# Patient Record
Sex: FEMALE | Race: WHITE | NOT HISPANIC OR LATINO | URBAN - METROPOLITAN AREA
[De-identification: names, ages, dates, MRNs, and addresses within clinical notes are randomized per-mention and may not be internally consistent; named-entity substitution may affect disease eponyms.]

---

## 2019-08-30 ENCOUNTER — APPOINTMENT (RX ONLY)
Dept: URBAN - METROPOLITAN AREA CLINIC 126 | Facility: CLINIC | Age: 69
Setting detail: DERMATOLOGY
End: 2019-08-30

## 2019-08-30 DIAGNOSIS — L64.8 OTHER ANDROGENIC ALOPECIA: ICD-10-CM

## 2019-08-30 DIAGNOSIS — L82.1 OTHER SEBORRHEIC KERATOSIS: ICD-10-CM

## 2019-08-30 DIAGNOSIS — D485 NEOPLASM OF UNCERTAIN BEHAVIOR OF SKIN: ICD-10-CM

## 2019-08-30 DIAGNOSIS — D22 MELANOCYTIC NEVI: ICD-10-CM

## 2019-08-30 DIAGNOSIS — L71.0 PERIORAL DERMATITIS: ICD-10-CM

## 2019-08-30 PROBLEM — D48.5 NEOPLASM OF UNCERTAIN BEHAVIOR OF SKIN: Status: ACTIVE | Noted: 2019-08-30

## 2019-08-30 PROBLEM — D22.5 MELANOCYTIC NEVI OF TRUNK: Status: ACTIVE | Noted: 2019-08-30

## 2019-08-30 PROCEDURE — ? DEFER

## 2019-08-30 PROCEDURE — 99203 OFFICE O/P NEW LOW 30 MIN: CPT

## 2019-08-30 PROCEDURE — ? COUNSELING

## 2019-08-30 PROCEDURE — ? MEDICAL CONSULTATION: BROWN SPOTS

## 2019-08-30 PROCEDURE — ? PRESCRIPTION

## 2019-08-30 PROCEDURE — ? TREATMENT REGIMEN

## 2019-08-30 PROCEDURE — ? DIAGNOSIS COMMENT

## 2019-08-30 RX ORDER — PIMECROLIMUS 10 MG/G
CREAM TOPICAL BID
Qty: 1 | Refills: 2 | Status: ERX | COMMUNITY
Start: 2019-08-30

## 2019-08-30 RX ORDER — SULFACETAMIDE SODIUM, SULFUR 98; 48 MG/G; MG/G
LIQUID TOPICAL
Qty: 1 | Refills: 2 | Status: ERX | COMMUNITY
Start: 2019-08-30

## 2019-08-30 RX ADMIN — SULFACETAMIDE SODIUM, SULFUR: 98; 48 LIQUID TOPICAL at 14:28

## 2019-08-30 RX ADMIN — PIMECROLIMUS: 10 CREAM TOPICAL at 14:28

## 2019-08-30 ASSESSMENT — LOCATION SIMPLE DESCRIPTION DERM
LOCATION SIMPLE: LEFT CHEEK
LOCATION SIMPLE: LEFT LIP
LOCATION SIMPLE: LEFT KNEE
LOCATION SIMPLE: RIGHT CHEEK
LOCATION SIMPLE: RIGHT UPPER BACK
LOCATION SIMPLE: CHEST
LOCATION SIMPLE: LEFT SCALP
LOCATION SIMPLE: ABDOMEN

## 2019-08-30 ASSESSMENT — LOCATION ZONE DERM
LOCATION ZONE: LEG
LOCATION ZONE: LIP
LOCATION ZONE: FACE
LOCATION ZONE: SCALP
LOCATION ZONE: TRUNK

## 2019-08-30 ASSESSMENT — LOCATION DETAILED DESCRIPTION DERM
LOCATION DETAILED: LEFT LOWER CUTANEOUS LIP
LOCATION DETAILED: LEFT SUPERIOR CENTRAL BUCCAL CHEEK
LOCATION DETAILED: LEFT KNEE
LOCATION DETAILED: EPIGASTRIC SKIN
LOCATION DETAILED: RIGHT INFERIOR MEDIAL UPPER BACK
LOCATION DETAILED: RIGHT MEDIAL UPPER BACK
LOCATION DETAILED: MIDDLE STERNUM
LOCATION DETAILED: LEFT MEDIAL FRONTAL SCALP
LOCATION DETAILED: RIGHT SUPERIOR MEDIAL BUCCAL CHEEK
LOCATION DETAILED: LEFT MEDIAL SUPERIOR CHEST

## 2019-08-30 NOTE — PROCEDURE: DEFER
Procedure To Be Performed At Next Visit: Biopsy by shave method
Detail Level: Simple
Introduction Text (Please End With A Colon): Tran Granados PA-C

## 2019-08-30 NOTE — PROCEDURE: MIPS QUALITY
Detail Level: Simple
Quality 474: Zoster Vaccination Status: Shingrix vaccine was not administered for reasons documented by clinician (e.g. patient administered vaccine other than Shingrix, patient allergy or other medical reasons, patient declined or other patient reasons, vaccine not available or other system reasons)
Quality 131: Pain Assessment And Follow-Up: Pain assessment using a standardized tool is documented as negative, no follow-up plan required
Quality 130: Documentation Of Current Medications In The Medical Record: Current Medications Documented
Additional Notes: Pain 0/10, shingrix denied

## 2019-09-10 ENCOUNTER — APPOINTMENT (RX ONLY)
Dept: URBAN - METROPOLITAN AREA CLINIC 128 | Facility: CLINIC | Age: 69
Setting detail: DERMATOLOGY
End: 2019-09-10

## 2019-09-10 DIAGNOSIS — D485 NEOPLASM OF UNCERTAIN BEHAVIOR OF SKIN: ICD-10-CM

## 2019-09-10 PROBLEM — D48.5 NEOPLASM OF UNCERTAIN BEHAVIOR OF SKIN: Status: ACTIVE | Noted: 2019-09-10

## 2019-09-10 PROCEDURE — ? DIAGNOSIS COMMENT

## 2019-09-10 PROCEDURE — 99212 OFFICE O/P EST SF 10 MIN: CPT

## 2019-09-10 PROCEDURE — ? DEFER

## 2019-09-10 ASSESSMENT — LOCATION ZONE DERM: LOCATION ZONE: LEG

## 2019-09-10 ASSESSMENT — LOCATION SIMPLE DESCRIPTION DERM: LOCATION SIMPLE: LEFT KNEE

## 2019-09-10 ASSESSMENT — LOCATION DETAILED DESCRIPTION DERM: LOCATION DETAILED: LEFT KNEE

## 2019-09-10 NOTE — PROCEDURE: DEFER
Introduction Text (Please End With A Colon): Joanne Garcia PA-C
Detail Level: Simple
Procedure To Be Performed At Next Visit: Biopsy by shave method

## 2020-06-22 ENCOUNTER — APPOINTMENT (RX ONLY)
Dept: URBAN - METROPOLITAN AREA CLINIC 128 | Facility: CLINIC | Age: 70
Setting detail: DERMATOLOGY
End: 2020-06-22

## 2020-06-22 DIAGNOSIS — L82.1 OTHER SEBORRHEIC KERATOSIS: ICD-10-CM

## 2020-06-22 DIAGNOSIS — L82.0 INFLAMED SEBORRHEIC KERATOSIS: ICD-10-CM

## 2020-06-22 DIAGNOSIS — D485 NEOPLASM OF UNCERTAIN BEHAVIOR OF SKIN: ICD-10-CM

## 2020-06-22 DIAGNOSIS — L81.4 OTHER MELANIN HYPERPIGMENTATION: ICD-10-CM

## 2020-06-22 PROBLEM — D48.5 NEOPLASM OF UNCERTAIN BEHAVIOR OF SKIN: Status: ACTIVE | Noted: 2020-06-22

## 2020-06-22 PROCEDURE — 17110 DESTRUCTION B9 LES UP TO 14: CPT

## 2020-06-22 PROCEDURE — ? BENIGN DESTRUCTION

## 2020-06-22 PROCEDURE — ? COSMETIC CONSULTATION: BROWN SPOTS

## 2020-06-22 PROCEDURE — 11102 TANGNTL BX SKIN SINGLE LES: CPT | Mod: 59

## 2020-06-22 PROCEDURE — ? COUNSELING

## 2020-06-22 PROCEDURE — ? BIOPSY BY SHAVE METHOD

## 2020-06-22 PROCEDURE — 99213 OFFICE O/P EST LOW 20 MIN: CPT | Mod: 25

## 2020-06-22 ASSESSMENT — LOCATION SIMPLE DESCRIPTION DERM
LOCATION SIMPLE: CHEST
LOCATION SIMPLE: RIGHT FOREARM
LOCATION SIMPLE: LEFT FOREARM
LOCATION SIMPLE: LEFT KNEE
LOCATION SIMPLE: LEFT PRETIBIAL REGION
LOCATION SIMPLE: RIGHT PRETIBIAL REGION

## 2020-06-22 ASSESSMENT — LOCATION DETAILED DESCRIPTION DERM
LOCATION DETAILED: LEFT DISTAL DORSAL FOREARM
LOCATION DETAILED: RIGHT PROXIMAL PRETIBIAL REGION
LOCATION DETAILED: RIGHT DISTAL DORSAL FOREARM
LOCATION DETAILED: UPPER STERNUM
LOCATION DETAILED: LEFT PROXIMAL DORSAL FOREARM
LOCATION DETAILED: LEFT DISTAL PRETIBIAL REGION
LOCATION DETAILED: LEFT PROXIMAL PRETIBIAL REGION
LOCATION DETAILED: LEFT KNEE
LOCATION DETAILED: RIGHT DISTAL PRETIBIAL REGION

## 2020-06-22 ASSESSMENT — LOCATION ZONE DERM
LOCATION ZONE: TRUNK
LOCATION ZONE: LEG
LOCATION ZONE: ARM

## 2020-06-22 NOTE — PROCEDURE: BIOPSY BY SHAVE METHOD
Body Location Override (Optional - Billing Will Still Be Based On Selected Body Map Location If Applicable): left knee
Detail Level: Detailed
Depth Of Biopsy: dermis
Was A Bandage Applied: Yes
Size Of Lesion In Cm: 0.6
X Size Of Lesion In Cm: 0
Biopsy Type: H and E
Biopsy Method: Personna blade
Anesthesia Type: 1% lidocaine without epinephrine and a 1:10 solution of 8.4% sodium bicarbonate
Anesthesia Volume In Cc (Will Not Render If 0): 0.5
Hemostasis: Aluminum Chloride and Electrocautery
Wound Care: Vaseline
Dressing: pressure dressing with telfa
Destruction After The Procedure: No
Type Of Destruction Used: Curettage
Curettage Text: The wound bed was treated with curettage after the biopsy was performed.
Cryotherapy Text: The wound bed was treated with cryotherapy after the biopsy was performed.
Electrodesiccation Text: The wound bed was treated with electrodesiccation after the biopsy was performed.
Electrodesiccation And Curettage Text: The wound bed was treated with electrodesiccation and curettage after the biopsy was performed.
Silver Nitrate Text: The wound bed was treated with silver nitrate after the biopsy was performed.
Lab: [facility]
Lab Facility: [id number]
Path Notes (To The Dermatopathologist): 0.6cm
Consent: Written consent was obtained and risks were reviewed including but not limited to scarring, infection, bleeding, scabbing, incomplete removal, nerve damage and allergy to anesthesia.
Post-Care Instructions: I reviewed with the patient in detail post-care instructions. Patient is to keep the biopsy site dry overnight, and then apply polysporin or vasoline twice daily until healed. Patient is to keep the lesion covered with a bandaid.
Notification Instructions: Patient will be notified of biopsy results. However, patient instructed to call the office if not contacted within 2 weeks.
Billing Type: [employer]
Information: Selecting Yes will display possible errors in your note based on the variables you have selected. This validation is only offered as a suggestion for you. PLEASE NOTE THAT THE VALIDATION TEXT WILL BE REMOVED WHEN YOU FINALIZE YOUR NOTE. IF YOU WANT TO FAX A PRELIMINARY NOTE YOU WILL NEED TO TOGGLE THIS TO 'NO' IF YOU DO NOT WANT IT IN YOUR FAXED NOTE.

## 2020-06-22 NOTE — PROCEDURE: BENIGN DESTRUCTION
Medical Necessity Clause: This procedure was medically necessary because the lesions that were treated were:
Medical Necessity Information: It is in your best interest to select a reason for this procedure from the list below. All of these items fulfill various CMS LCD requirements except the new and changing color options.
Include Z78.9 (Other Specified Conditions Influencing Health Status) As An Associated Diagnosis?: Yes
Anesthesia Volume In Cc: 0.5
Post-Care Instructions: I reviewed with the patient in detail post-care instructions. Patient is to wear sunprotection, and avoid picking at any of the treated lesions. Pt may apply Vaseline to crusted or scabbing areas.
Add 52 Modifier (Optional): no
Treatment Number (Will Not Render If 0): 0
Detail Level: Simple
Consent: The patient's consent was obtained including but not limited to risks of crusting, scabbing, blistering, scarring, darker or lighter pigmentary change, recurrence, incomplete removal and infection.

## 2020-07-09 ENCOUNTER — APPOINTMENT (RX ONLY)
Dept: URBAN - METROPOLITAN AREA CLINIC 126 | Facility: CLINIC | Age: 70
Setting detail: DERMATOLOGY
End: 2020-07-09

## 2020-07-09 DIAGNOSIS — Z41.9 ENCOUNTER FOR PROCEDURE FOR PURPOSES OTHER THAN REMEDYING HEALTH STATE, UNSPECIFIED: ICD-10-CM

## 2020-07-09 PROCEDURE — ? LASER BROWN SPOTS

## 2020-07-09 ASSESSMENT — LOCATION ZONE DERM
LOCATION ZONE: ARM
LOCATION ZONE: FACE

## 2020-07-09 ASSESSMENT — LOCATION DETAILED DESCRIPTION DERM
LOCATION DETAILED: LEFT SUPERIOR LATERAL BUCCAL CHEEK
LOCATION DETAILED: RIGHT SUPERIOR LATERAL BUCCAL CHEEK
LOCATION DETAILED: RIGHT DISTAL RADIAL DORSAL FOREARM
LOCATION DETAILED: LEFT INFERIOR CENTRAL MALAR CHEEK
LOCATION DETAILED: RIGHT INFERIOR CENTRAL MALAR CHEEK
LOCATION DETAILED: RIGHT DISTAL DORSAL FOREARM

## 2020-07-09 ASSESSMENT — LOCATION SIMPLE DESCRIPTION DERM
LOCATION SIMPLE: RIGHT CHEEK
LOCATION SIMPLE: LEFT CHEEK
LOCATION SIMPLE: RIGHT FOREARM

## 2020-07-09 NOTE — PROCEDURE: LASER BROWN SPOTS
Laser Type: Q-switched Nd:Yag 532nm
Price (Use Numbers Only, No Special Characters Or $): 0
Consent: Written consent obtained, risks reviewed including but not limited to crusting, scabbing, blistering, scarring, darker or lighter pigmentary change, and/or incomplete removal.
Post Procedure Text: Vaseline and/or sunblock applied. Post care reviewed with patient. Strict sun avoidance discussed.
Detail Level: Simple
Spot Size In Mm: 4
Post-Care Instructions: I reviewed with the patient in detail post-care instructions. Patient is to apply vaseline with a q-tip to all crusted areas, and avoid picking at any scabs. Pt should stay away from the sun and wear sun protection until fully healed.
Fluence: 1.2

## 2021-02-18 ENCOUNTER — OFFICE VISIT (OUTPATIENT)
Dept: URBAN - METROPOLITAN AREA CLINIC 9 | Facility: CLINIC | Age: 71
End: 2021-02-18

## 2022-06-04 ENCOUNTER — TELEPHONE ENCOUNTER (OUTPATIENT)
Dept: URBAN - METROPOLITAN AREA CLINIC 68 | Facility: CLINIC | Age: 72
End: 2022-06-04

## 2022-06-04 RX ORDER — PANTOPRAZOLE SODIUM 40 MG/1
TABLET, DELAYED RELEASE ORAL TWICE DAILY
Qty: 30 | Refills: 0 | OUTPATIENT
Start: 2013-02-05 | End: 2013-05-06

## 2022-06-04 RX ORDER — CEFDINIR 300 MG/1
CEFDINIR( 300MG ORAL  TWICE DAILY ) INACTIVE -HX ENTRY CAPSULE ORAL TWICE DAILY
OUTPATIENT
Start: 2012-11-02

## 2022-06-04 RX ORDER — AZITHROMYCIN 500 MG/1
AZITHROMYCIN( 500MG ORAL  DAILY ) INACTIVE -HX ENTRY TABLET, FILM COATED ORAL DAILY
OUTPATIENT
Start: 2012-11-02

## 2022-06-04 RX ORDER — PANTOPRAZOLE SODIUM 40 MG/1
PANTOPRAZOLE SODIUM( 40MG ORAL  DAILY ) INACTIVE -HX ENTRY TABLET, DELAYED RELEASE ORAL DAILY
OUTPATIENT
Start: 2013-01-09

## 2022-06-05 ENCOUNTER — TELEPHONE ENCOUNTER (OUTPATIENT)
Dept: URBAN - METROPOLITAN AREA CLINIC 68 | Facility: CLINIC | Age: 72
End: 2022-06-05

## 2022-06-05 RX ORDER — METHOTREXATE SODIUM 25 MG/ML
METHOTREXATE SODIUM( 25MG/ML INJECTION  WEEKLY ) ACTIVE -HX ENTRY INJECTION, SOLUTION INTRA-ARTERIAL; INTRAMUSCULAR; INTRAVENOUS WEEKLY
Status: ACTIVE | COMMUNITY
Start: 2013-01-30

## 2022-06-05 RX ORDER — B-COMPLEX WITH VITAMIN C
VITAMIN B COMPLEX(  ORAL  DAILY ) ACTIVE -HX ENTRY TABLET ORAL DAILY
Status: ACTIVE | COMMUNITY
Start: 2013-01-30

## 2022-06-05 RX ORDER — MONTELUKAST SODIUM 10 MG/1
MONTELUKAST SODIUM( 10MG ORAL  DAILY ) ACTIVE -HX ENTRY TABLET, FILM COATED ORAL DAILY
Status: ACTIVE | COMMUNITY
Start: 2013-01-30

## 2022-06-05 RX ORDER — DOCUSATE SODIUM 100 MG/1
STOOL SOFTENER( 100MG ORAL  3 PER DAY ) ACTIVE -HX ENTRY CAPSULE, LIQUID FILLED ORAL
Status: ACTIVE | COMMUNITY
Start: 2013-01-30

## 2022-06-05 RX ORDER — HYDROCORTISONE ACETATE 25 MG/1
SUPPOSITORY RECTAL
Qty: 14 | Refills: 2 | Status: ACTIVE | COMMUNITY
Start: 2012-11-02

## 2022-06-05 RX ORDER — MULTIVITAMIN
MULTIPLE VITAMIN(  ORAL  DAILY ) ACTIVE -HX ENTRY TABLET ORAL DAILY
Status: ACTIVE | COMMUNITY
Start: 2013-01-30

## 2022-06-05 RX ORDER — FOLIC ACID 1 MG/1
FOLIC ACID( 1MG ORAL  3 TABS DAILY ) ACTIVE -HX ENTRY TABLET ORAL
Status: ACTIVE | COMMUNITY
Start: 2013-01-30

## 2022-06-25 ENCOUNTER — TELEPHONE ENCOUNTER (OUTPATIENT)
Age: 72
End: 2022-06-25

## 2022-06-25 RX ORDER — PREDNISONE 10 MG/1
PREDNISONE (PAK)( 10MG ORAL  4 MG TAPERING DOWN ) INACTIVE -HX ENTRY TABLET ORAL
OUTPATIENT
Start: 2013-01-30

## 2022-06-25 RX ORDER — PANTOPRAZOLE 40 MG/1
PANTOPRAZOLE SODIUM( 40MG ORAL  DAILY ) INACTIVE -HX ENTRY TABLET, DELAYED RELEASE ORAL DAILY
OUTPATIENT
Start: 2013-01-09

## 2022-06-25 RX ORDER — PANTOPRAZOLE 40 MG/1
TABLET, DELAYED RELEASE ORAL TWICE DAILY
Qty: 30 | Refills: 0 | OUTPATIENT
Start: 2013-02-05 | End: 2013-05-06

## 2022-06-25 RX ORDER — CEFDINIR 300 MG/1
CEFDINIR( 300MG ORAL  TWICE DAILY ) INACTIVE -HX ENTRY CAPSULE ORAL TWICE DAILY
OUTPATIENT
Start: 2012-11-02

## 2022-06-25 RX ORDER — AZITHROMYCIN DIHYDRATE 500 MG/1
AZITHROMYCIN( 500MG ORAL  DAILY ) INACTIVE -HX ENTRY TABLET, FILM COATED ORAL DAILY
OUTPATIENT
Start: 2012-11-02

## 2022-06-26 ENCOUNTER — TELEPHONE ENCOUNTER (OUTPATIENT)
Age: 72
End: 2022-06-26

## 2022-06-26 RX ORDER — FOLIC ACID 1 MG/1
FOLIC ACID( 1MG ORAL  3 TABS DAILY ) ACTIVE -HX ENTRY TABLET ORAL
Status: ACTIVE | COMMUNITY
Start: 2013-01-30

## 2022-06-26 RX ORDER — METHOTREXATE SODIUM 25 MG/ML
METHOTREXATE SODIUM( 25MG/ML INJECTION  WEEKLY ) ACTIVE -HX ENTRY INJECTION, SOLUTION INTRA-ARTERIAL; INTRAMUSCULAR; INTRAVENOUS WEEKLY
Status: ACTIVE | COMMUNITY
Start: 2013-01-30

## 2022-06-26 RX ORDER — ASCORBIC ACID 500 MG
VITAMIN C CR( 500MG ORAL  DAILY ) ACTIVE -HX ENTRY CAPSULE, EXTENDED RELEASE ORAL DAILY
Status: ACTIVE | COMMUNITY
Start: 2013-01-30

## 2022-06-26 RX ORDER — HYDROCORTISONE ACETATE 25 MG/1
SUPPOSITORY RECTAL
Qty: 14 | Refills: 2 | Status: ACTIVE | COMMUNITY
Start: 2012-11-02

## 2022-06-26 RX ORDER — HYDROCORTISONE ACETATE AND PRAMOXINE HYDROCHLORIDE 25; 10 MG/G; MG/G
CREAM TOPICAL
Qty: 14 | Refills: 14 | Status: ACTIVE | COMMUNITY
Start: 2012-11-02

## 2022-06-26 RX ORDER — DOCUSATE SODIUM 100 MG/1
STOOL SOFTENER( 100MG ORAL  3 PER DAY ) ACTIVE -HX ENTRY CAPSULE, LIQUID FILLED ORAL
Status: ACTIVE | COMMUNITY
Start: 2013-01-30

## 2022-06-26 RX ORDER — B-COMPLEX WITH VITAMIN C
VITAMIN B COMPLEX(  ORAL  DAILY ) ACTIVE -HX ENTRY TABLET ORAL DAILY
Status: ACTIVE | COMMUNITY
Start: 2013-01-30

## 2022-06-26 RX ORDER — MONTELUKAST 10 MG/1
MONTELUKAST SODIUM( 10MG ORAL  DAILY ) ACTIVE -HX ENTRY TABLET, FILM COATED ORAL DAILY
Status: ACTIVE | COMMUNITY
Start: 2013-01-30

## 2022-06-26 RX ORDER — CHOLECALCIFEROL (VITAMIN D3) 25 MCG
VITAMIN D( 1000UNIT ORAL  DAILY ) ACTIVE -HX ENTRY TABLET ORAL DAILY
Status: ACTIVE | COMMUNITY
Start: 2013-01-30

## 2022-07-14 ENCOUNTER — FOLLOW UP (OUTPATIENT)
Dept: URBAN - METROPOLITAN AREA CLINIC 33 | Facility: CLINIC | Age: 72
End: 2022-07-14

## 2022-07-14 VITALS — WEIGHT: 146 LBS | BODY MASS INDEX: 27.56 KG/M2 | HEIGHT: 61 IN

## 2022-07-14 DIAGNOSIS — H35.3131: ICD-10-CM

## 2022-07-14 DIAGNOSIS — Z79.899: ICD-10-CM

## 2022-07-14 DIAGNOSIS — H43.391: ICD-10-CM

## 2022-07-14 DIAGNOSIS — H35.371: ICD-10-CM

## 2022-07-14 DIAGNOSIS — G43.B0: ICD-10-CM

## 2022-07-14 DIAGNOSIS — H04.123: ICD-10-CM

## 2022-07-14 DIAGNOSIS — H47.291: ICD-10-CM

## 2022-07-14 PROCEDURE — 92014 COMPRE OPH EXAM EST PT 1/>: CPT

## 2022-07-14 PROCEDURE — 92250 FUNDUS PHOTOGRAPHY W/I&R: CPT

## 2022-07-14 PROCEDURE — 92083 EXTENDED VISUAL FIELD XM: CPT

## 2022-07-14 PROCEDURE — 92134 CPTRZ OPH DX IMG PST SGM RTA: CPT

## 2022-07-14 ASSESSMENT — TONOMETRY
OS_IOP_MMHG: 9
OD_IOP_MMHG: 15

## 2022-07-14 ASSESSMENT — VISUAL ACUITY: OS_SC: 20/20-1

## 2022-07-28 ENCOUNTER — EMERGENCY VISIT (OUTPATIENT)
Dept: URBAN - METROPOLITAN AREA CLINIC 33 | Facility: CLINIC | Age: 72
End: 2022-07-28

## 2022-07-28 VITALS — WEIGHT: 145 LBS | BODY MASS INDEX: 27.38 KG/M2 | HEIGHT: 61 IN

## 2022-07-28 DIAGNOSIS — Z79.899: ICD-10-CM

## 2022-07-28 DIAGNOSIS — G43.B0: ICD-10-CM

## 2022-07-28 DIAGNOSIS — H25.13: ICD-10-CM

## 2022-07-28 DIAGNOSIS — H02.834: ICD-10-CM

## 2022-07-28 DIAGNOSIS — H35.3131: ICD-10-CM

## 2022-07-28 DIAGNOSIS — H47.291: ICD-10-CM

## 2022-07-28 DIAGNOSIS — H02.831: ICD-10-CM

## 2022-07-28 DIAGNOSIS — H43.391: ICD-10-CM

## 2022-07-28 DIAGNOSIS — H04.123: ICD-10-CM

## 2022-07-28 DIAGNOSIS — H57.13: ICD-10-CM

## 2022-07-28 DIAGNOSIS — H35.371: ICD-10-CM

## 2022-07-28 PROCEDURE — 92014 COMPRE OPH EXAM EST PT 1/>: CPT

## 2022-07-28 ASSESSMENT — VISUAL ACUITY: OS_SC: 20/20

## 2022-07-28 ASSESSMENT — TONOMETRY
OS_IOP_MMHG: 10
OD_IOP_MMHG: 11

## 2022-07-30 ENCOUNTER — TELEPHONE ENCOUNTER (OUTPATIENT)
Age: 72
End: 2022-07-30

## 2022-07-31 ENCOUNTER — TELEPHONE ENCOUNTER (OUTPATIENT)
Age: 72
End: 2022-07-31

## 2022-08-03 ENCOUNTER — EMERGENCY VISIT (OUTPATIENT)
Dept: URBAN - METROPOLITAN AREA CLINIC 32 | Facility: CLINIC | Age: 72
End: 2022-08-03

## 2022-08-03 DIAGNOSIS — H11.32: ICD-10-CM

## 2022-08-03 DIAGNOSIS — H35.3131: ICD-10-CM

## 2022-08-03 DIAGNOSIS — H47.291: ICD-10-CM

## 2022-08-03 DIAGNOSIS — H57.13: ICD-10-CM

## 2022-08-03 PROCEDURE — 99213 OFFICE O/P EST LOW 20 MIN: CPT

## 2022-08-03 PROCEDURE — 92250 FUNDUS PHOTOGRAPHY W/I&R: CPT

## 2022-08-03 ASSESSMENT — TONOMETRY
OD_IOP_MMHG: 12
OS_IOP_MMHG: 12

## 2022-08-03 ASSESSMENT — VISUAL ACUITY: OS_SC: 20/25-2

## 2023-08-07 ENCOUNTER — COMPREHENSIVE EXAM (OUTPATIENT)
Dept: URBAN - METROPOLITAN AREA CLINIC 26 | Facility: CLINIC | Age: 73
End: 2023-08-07

## 2023-08-07 VITALS — HEIGHT: 61 IN | WEIGHT: 175 LBS | BODY MASS INDEX: 33.04 KG/M2

## 2023-08-07 DIAGNOSIS — H35.3131: ICD-10-CM

## 2023-08-07 DIAGNOSIS — G43.B0: ICD-10-CM

## 2023-08-07 DIAGNOSIS — Z79.899: ICD-10-CM

## 2023-08-07 DIAGNOSIS — H35.371: ICD-10-CM

## 2023-08-07 DIAGNOSIS — H43.391: ICD-10-CM

## 2023-08-07 DIAGNOSIS — H04.123: ICD-10-CM

## 2023-08-07 DIAGNOSIS — H47.291: ICD-10-CM

## 2023-08-07 PROCEDURE — 92014 COMPRE OPH EXAM EST PT 1/>: CPT

## 2023-08-07 PROCEDURE — 92134 CPTRZ OPH DX IMG PST SGM RTA: CPT

## 2023-08-07 PROCEDURE — 92250 FUNDUS PHOTOGRAPHY W/I&R: CPT

## 2023-08-07 PROCEDURE — 92083 EXTENDED VISUAL FIELD XM: CPT

## 2023-08-07 ASSESSMENT — VISUAL ACUITY: OS_SC: 20/25-2

## 2023-08-07 ASSESSMENT — TONOMETRY
OD_IOP_MMHG: 11
OS_IOP_MMHG: 13

## 2024-09-05 ENCOUNTER — COMPREHENSIVE EXAM (OUTPATIENT)
Dept: URBAN - METROPOLITAN AREA CLINIC 33 | Facility: CLINIC | Age: 74
End: 2024-09-05

## 2024-09-05 VITALS — WEIGHT: 160 LBS | BODY MASS INDEX: 30.21 KG/M2 | HEIGHT: 61 IN

## 2024-09-05 DIAGNOSIS — H25.13: ICD-10-CM

## 2024-09-05 DIAGNOSIS — Z79.899: ICD-10-CM

## 2024-09-05 DIAGNOSIS — H02.834: ICD-10-CM

## 2024-09-05 DIAGNOSIS — H53.8: ICD-10-CM

## 2024-09-05 DIAGNOSIS — G43.B0: ICD-10-CM

## 2024-09-05 DIAGNOSIS — H47.291: ICD-10-CM

## 2024-09-05 DIAGNOSIS — H35.3131: ICD-10-CM

## 2024-09-05 DIAGNOSIS — H04.123: ICD-10-CM

## 2024-09-05 DIAGNOSIS — H02.831: ICD-10-CM

## 2024-09-05 DIAGNOSIS — H43.391: ICD-10-CM

## 2024-09-05 DIAGNOSIS — H35.371: ICD-10-CM

## 2024-09-05 PROCEDURE — 92083 EXTENDED VISUAL FIELD XM: CPT

## 2024-09-05 PROCEDURE — 92014 COMPRE OPH EXAM EST PT 1/>: CPT

## 2024-09-05 PROCEDURE — 92134 CPTRZ OPH DX IMG PST SGM RTA: CPT

## 2024-09-05 PROCEDURE — 92250 FUNDUS PHOTOGRAPHY W/I&R: CPT | Mod: 59
